# Patient Record
Sex: MALE | Race: WHITE
[De-identification: names, ages, dates, MRNs, and addresses within clinical notes are randomized per-mention and may not be internally consistent; named-entity substitution may affect disease eponyms.]

---

## 2020-06-08 ENCOUNTER — HOSPITAL ENCOUNTER (EMERGENCY)
Dept: HOSPITAL 43 - DL.ED | Age: 25
Discharge: HOME | End: 2020-06-08
Payer: COMMERCIAL

## 2020-06-08 DIAGNOSIS — Z23: ICD-10-CM

## 2020-06-08 DIAGNOSIS — S60.455A: Primary | ICD-10-CM
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
## 2020-06-08 NOTE — EDM.PDOC
ED HPI GENERAL MEDICAL PROBLEM





- General


Stated Complaint: HOOK IN FINGER


Time Seen by Provider: 06/08/20 23:10


Source of Information: Reports: Patient


History Limitations: Reports: No Limitations





- History of Present Illness


INITIAL COMMENTS - FREE TEXT/NARRATIVE: 





fish hook in left 4th finger PTA.  





- Related Data


 Allergies











Allergy/AdvReac Type Severity Reaction Status Date / Time


 


No Known Allergies Allergy   Verified 06/08/20 23:11











Home Meds: 


 Home Meds





. [No Known Home Meds]  06/08/20 [History]











ED ROS GENERAL





- Review of Systems


Review Of Systems: Comprehensive ROS is negative, except as noted in HPI.





ED EXAM, SKIN/RASH


Exam: See Below


Exam Limited By: No Limitations


General Appearance: Alert, Mild Distress


Eye Exam: Bilateral Eye: EOMI


Ears: Normal External Exam, Hearing Grossly Normal


Nose: Normal Inspection


Throat/Mouth: Normal Inspection


Head: Atraumatic, Normocephalic


Neck: Normal Inspection, Full Range of Motion


Respiratory/Chest: No Respiratory Distress, Normal Breath Sounds


Cardiovascular: Normal Peripheral Pulses


Neurological: Alert, Oriented


Psychiatric: Normal Affect


Skin: Warm, Dry


Characteristics: Other (single duy of trebel hook palmar surface above MIP.)





ED SKIN PROCEDURES





- Foreign Body Removal


Consent Obtained:: Patient


Performing Doctor:: Vilma Mahan


Foreign Body Other Location Comment:: left 4th finger between MIP and DIP 

casillas surface, single duy of treble hook.


Anesthesia Type: Local (1% lido, 1.5ml)


Complications:: No


Comments:: 





single duy pushed thru, duy cut off, remainder of hook reversed out. No 

complications, tolerated well. 





Course





- Vital Signs


Last Recorded V/S: 


 Last Vital Signs











Temp  97.8 F   06/08/20 23:11


 


Pulse  67   06/08/20 23:11


 


Resp  16   06/08/20 23:11


 


BP  162/96 H  06/08/20 23:11


 


Pulse Ox  99   06/08/20 23:11














- Orders/Labs/Meds


Orders: 


 Active Orders 24 hr











 Category Date Time Status


 


 Vaccines to be Administered [RC] PER UNIT ROUTINE Care  06/08/20 23:10 Active











Meds: 


Medications














Discontinued Medications














Generic Name Dose Route Start Last Admin





  Trade Name Freq  PRN Reason Stop Dose Admin


 


Bacitracin  1 dose  06/08/20 22:43  06/08/20 23:21





  Bacitracin Oint 1 Gm  TOP  06/08/20 22:44  1 dose





  ONETIME ONE   Administration





     





     





     





     


 


Diphtheria/Tetanus/Acell Pertussis  0.5 ml  06/08/20 23:10  06/08/20 23:21





  Adacel  IM  06/08/20 23:11  0.5 ml





  .ONCE ONE   Administration





     





     





     





     


 


Lidocaine HCl  30 ml  06/08/20 22:43  06/08/20 23:20





  Xylocaine-Mpf 1%  INJECT  06/08/20 22:44  30 ml





  ONETIME ONE   Administration





     





     





     





     














Departure





- Departure


Time of Disposition: 23:23


Disposition: Home, Self-Care 01


Condition: Good


Clinical Impression: 


 Removal of foreign body





Fish hook injury of finger of left hand


Qualifiers:


 Encounter type: initial encounter Qualified Code(s): S69.92XA - Unspecified 

injury of left wrist, hand and finger(s), initial encounter








- Discharge Information


*PRESCRIPTION DRUG MONITORING PROGRAM REVIEWED*: No


*COPY OF PRESCRIPTION DRUG MONITORING REPORT IN PATIENT JOSÉ MIGUEL: No


Instructions:  Puncture Wound, Easy-to-Read


Forms:  ED Department Discharge


Additional Instructions: 


keep area clean and dry 


wash twice daily with soap and water


follow up if redness swelling or drainage from area





Sepsis Event Note





- Focused Exam


Vital Signs: 


 Vital Signs











  Temp Pulse Resp BP Pulse Ox


 


 06/08/20 23:11  97.8 F  67  16  162/96 H  99











Date Exam was Performed: 06/09/20


Time Exam was Performed: 02:48





- My Orders


Last 24 Hours: 


My Active Orders





06/08/20 23:10


Vaccines to be Administered [RC] PER UNIT ROUTINE 














- Assessment/Plan


Last 24 Hours: 


My Active Orders





06/08/20 23:10


Vaccines to be Administered [RC] PER UNIT ROUTINE